# Patient Record
Sex: FEMALE | Race: WHITE | NOT HISPANIC OR LATINO | Employment: OTHER | URBAN - METROPOLITAN AREA
[De-identification: names, ages, dates, MRNs, and addresses within clinical notes are randomized per-mention and may not be internally consistent; named-entity substitution may affect disease eponyms.]

---

## 2018-01-01 ENCOUNTER — APPOINTMENT (EMERGENCY)
Dept: RADIOLOGY | Facility: HOSPITAL | Age: 83
End: 2018-01-01
Payer: COMMERCIAL

## 2018-01-01 ENCOUNTER — HOSPITAL ENCOUNTER (EMERGENCY)
Facility: HOSPITAL | Age: 83
End: 2018-02-22
Attending: EMERGENCY MEDICINE | Admitting: EMERGENCY MEDICINE
Payer: COMMERCIAL

## 2018-01-01 DIAGNOSIS — I47.2 VT (VENTRICULAR TACHYCARDIA) (HCC): ICD-10-CM

## 2018-01-01 DIAGNOSIS — I49.01 VF (VENTRICULAR FIBRILLATION) (HCC): ICD-10-CM

## 2018-01-01 DIAGNOSIS — I46.9 CARDIAC ARREST (HCC): Primary | ICD-10-CM

## 2018-01-01 LAB
ANION GAP BLD CALC-SCNC: 18 MMOL/L (ref 4–13)
BUN BLD-MCNC: 56 MG/DL (ref 5–25)
CA-I BLD-SCNC: 1.19 MMOL/L (ref 1.12–1.32)
CHLORIDE BLD-SCNC: 113 MMOL/L (ref 100–108)
CREAT BLD-MCNC: 1.1 MG/DL (ref 0.6–1.3)
GFR SERPL CREATININE-BSD FRML MDRD: 43 ML/MIN/1.73SQ M
GLUCOSE SERPL-MCNC: 276 MG/DL (ref 65–140)
HCT VFR BLD CALC: 33 % (ref 34.8–46.1)
HGB BLDA-MCNC: 11.2 G/DL (ref 11.5–15.4)
PCO2 BLD: 17 MMOL/L (ref 21–32)
POTASSIUM BLD-SCNC: 3.9 MMOL/L (ref 3.5–5.3)
SODIUM BLD-SCNC: 143 MMOL/L (ref 136–145)
SPECIMEN SOURCE: ABNORMAL

## 2018-01-01 PROCEDURE — 80047 BASIC METABLC PNL IONIZED CA: CPT

## 2018-01-01 PROCEDURE — 99285 EMERGENCY DEPT VISIT HI MDM: CPT

## 2018-01-01 PROCEDURE — 85014 HEMATOCRIT: CPT

## 2018-01-01 PROCEDURE — 96375 TX/PRO/DX INJ NEW DRUG ADDON: CPT

## 2018-01-01 PROCEDURE — 92950 HEART/LUNG RESUSCITATION CPR: CPT

## 2018-01-01 PROCEDURE — 96374 THER/PROPH/DIAG INJ IV PUSH: CPT

## 2018-01-01 RX ORDER — EPINEPHRINE 0.1 MG/ML
SYRINGE (ML) INJECTION CODE/TRAUMA/SEDATION MEDICATION
Status: COMPLETED | OUTPATIENT
Start: 2018-01-01 | End: 2018-01-01

## 2018-01-01 RX ORDER — AMIODARONE HYDROCHLORIDE 50 MG/ML
INJECTION, SOLUTION INTRAVENOUS CODE/TRAUMA/SEDATION MEDICATION
Status: COMPLETED | OUTPATIENT
Start: 2018-01-01 | End: 2018-01-01

## 2018-01-01 RX ORDER — SODIUM BICARBONATE 84 MG/ML
INJECTION, SOLUTION INTRAVENOUS CODE/TRAUMA/SEDATION MEDICATION
Status: COMPLETED | OUTPATIENT
Start: 2018-01-01 | End: 2018-01-01

## 2018-01-01 RX ORDER — LOSARTAN POTASSIUM 25 MG/1
50 TABLET ORAL DAILY
COMMUNITY

## 2018-01-01 RX ORDER — DOCUSATE SODIUM 100 MG/1
100 CAPSULE, LIQUID FILLED ORAL DAILY
COMMUNITY

## 2018-01-01 RX ORDER — ASPIRIN 81 MG/1
81 TABLET, CHEWABLE ORAL DAILY
COMMUNITY

## 2018-01-01 RX ORDER — FUROSEMIDE 40 MG/1
40 TABLET ORAL DAILY
COMMUNITY

## 2018-01-01 RX ORDER — ATORVASTATIN CALCIUM 40 MG/1
40 TABLET, FILM COATED ORAL DAILY
COMMUNITY

## 2018-01-01 RX ORDER — CLOPIDOGREL BISULFATE 75 MG/1
75 TABLET ORAL DAILY
COMMUNITY

## 2018-01-01 RX ORDER — NITROGLYCERIN 0.4 MG/1
0.4 TABLET SUBLINGUAL
COMMUNITY

## 2018-01-01 RX ADMIN — EPINEPHRINE 1 MG: 0.1 INJECTION, SOLUTION ENDOTRACHEAL; INTRACARDIAC; INTRAVENOUS at 21:17

## 2018-01-01 RX ADMIN — SODIUM BICARBONATE 50 MEQ: 84 INJECTION, SOLUTION INTRAVENOUS at 21:20

## 2018-01-01 RX ADMIN — EPINEPHRINE 1 MG: 0.1 INJECTION, SOLUTION ENDOTRACHEAL; INTRACARDIAC; INTRAVENOUS at 21:25

## 2018-01-01 RX ADMIN — AMIODARONE HYDROCHLORIDE 300 MG: 50 INJECTION, SOLUTION INTRAVENOUS at 21:18

## 2018-01-01 RX ADMIN — EPINEPHRINE 1 MG: 0.1 INJECTION, SOLUTION ENDOTRACHEAL; INTRACARDIAC; INTRAVENOUS at 21:21

## 2018-01-01 RX ADMIN — SODIUM BICARBONATE 50 MEQ: 84 INJECTION, SOLUTION INTRAVENOUS at 21:22

## 2018-02-22 NOTE — CODE DOCUMENTATION
Portable x-ray called to bedside  However, patient became pulseless  V-fib noted on monitor  Compressions via saturnino resumed at this time

## 2018-02-22 NOTE — CODE DOCUMENTATION
Compressions stopped at this time  No pulse noted  Patient in v-fib  Patient shocked at 200 J  Compressions via saturnino immediately resumed

## 2018-02-22 NOTE — CODE DOCUMENTATION
Compressions stopped at this time  Carotid and femoral pulses noted  Patient noted to be in sinus tachycardia at a rate of 113

## 2018-02-22 NOTE — CODE DOCUMENTATION
Compressions paused at this time to check pulses  No pulses noted  At this time, ACLS in progress for total of 36 minutes  Despite all efforts, patient remains in a non-life sustaining rhythm  Code team agreed all efforts exhausted at this time  Patient pronounced  by Dr Aniya Sher and Dr Eunice Hilliard at this time

## 2018-02-22 NOTE — ED PROVIDER NOTES
History  Chief Complaint   Patient presents with    Cardiac Arrest     Patient was going through mail with her son when she "slumped over " Witnessed cardiac arrest  EMS on scene at 2051  This is a 81 yo F who presented as a cardiac arrest  According to paramedics and verified by son, patient was having a conversation with son when she froze, clenched her chest and fell  Son started CPR on her  Son states she lives by herself and was doing fine today, he normally goes to visit her  Paramedics state initial rhythm was Vfib received 3 total shocks and 3 epinephrine and lidocaine  Total ACLS prior to arrival at the hospital was 35 minutes with no ROSC  On arrival patient was intubated, tube confirmed by capnography, breath sounds  Rhyhtm on arrival was Vfib  She received multiple rounds of epinephrine, amiodarone, and bicarbonate  Bedside US performed which showed Vfib rhythm with no pericardial effusion  Normal lung sliding bilaterally and abdomen negative for blood  No signs of trauma appreciated  With no ROSC, time of death called 2127  Spoke with son regarding death  Cause of death : Ventricular fibrillation, cardiac arrest              Prior to Admission Medications   Prescriptions Last Dose Informant Patient Reported?  Taking?   aspirin 81 mg chewable tablet   Yes Yes   Sig: Chew 81 mg daily   atorvastatin (LIPITOR) 40 mg tablet   Yes Yes   Sig: Take 40 mg by mouth daily   clopidogrel (PLAVIX) 75 mg tablet   Yes Yes   Sig: Take 75 mg by mouth daily   docusate sodium (COLACE) 100 mg capsule   Yes Yes   Sig: Take 100 mg by mouth daily   furosemide (LASIX) 40 mg tablet   Yes Yes   Sig: Take 40 mg by mouth daily   losartan (COZAAR) 25 mg tablet   Yes Yes   Sig: Take 50 mg by mouth daily   metFORMIN (GLUCOPHAGE) 500 mg tablet   Yes Yes   Sig: Take 500 mg by mouth 2 (two) times a day   metoprolol tartrate (LOPRESSOR) 25 mg tablet   Yes Yes   Sig: Take 12 5 mg by mouth every 12 (twelve) hours   nitroglycerin (NITROSTAT) 0 4 mg SL tablet   Yes Yes   Sig: Place 0 4 mg under the tongue every 5 (five) minutes as needed for chest pain   sitaGLIPtin (JANUVIA) 50 mg tablet   Yes Yes   Sig: Take 50 mg by mouth daily      Facility-Administered Medications: None       Past Medical History:   Diagnosis Date    Diabetes mellitus ([facility] [address] )     Hyperlipidemia     Hypertension     MI, old        History reviewed  No pertinent surgical history  History reviewed  No pertinent family history  I have reviewed and agree with the history as documented  Social History   Substance Use Topics    Smoking status: Unknown If Ever Smoked    Smokeless tobacco: Not on file    Alcohol use Not on file        Review of Systems   Unable to perform ROS: Intubated       Physical Exam  ED Triage Vitals   Temp Pulse Respirations Blood Pressure SpO2   -- 02/21/18 2123 02/21/18 2127 02/21/18 2114 --    (!) 113 (!) 0 141/80       Temp src Heart Rate Source Patient Position - Orthostatic VS BP Location FiO2 (%)   -- 02/21/18 2123 02/21/18 2114 02/21/18 2114 --    Monitor Lying Right arm       Pain Score       --                  Orthostatic Vital Signs  Vitals:    02/21/18 2114 02/21/18 2123 02/21/18 2127   BP: 141/80  (!) 0/0   Pulse:  (!) 113 (!) 0   Patient Position - Orthostatic VS: Lying         Physical Exam   Constitutional:   Pale, cold extremities  intubated   HENT:   Head: Normocephalic  Eyes:   Dilated and fixed pupils    Cardiovascular:   Ventricular fibrillation, no pulse   Pulmonary/Chest:   Equal breath sounds bilaterally, intubated with 7 0 tube prehospital    Abdominal:   Distended, soft abdmen    Neurological:   Unresponsive, GCS 3   Skin: Capillary refill takes more than 3 seconds  No rash noted     Cold extremities        ED Medications  Medications    EMS REPLENISHMENT MED ( Does not apply Given to EMS 2/21/18 2127)   EPINEPHrine (ADRENALIN) 1 mg/10 mL injection (1 mg Intravenous Given 2/21/18 2117)   amiodarone 150 mg/3 mL injection (300 mg Intravenous Given 2/21/18 2118)   sodium bicarbonate 50 mEq/50 mL injection (50 mEq Intravenous Given 2/21/18 2120)   EPINEPHrine (ADRENALIN) 1 mg/10 mL injection (1 mg Intravenous Given 2/21/18 2121)   sodium bicarbonate 50 mEq/50 mL injection (50 mEq Intravenous Given 2/21/18 2122)   EPINEPHrine (ADRENALIN) 1 mg/10 mL injection (1 mg Intravenous Given 2/21/18 2125)       Diagnostic Studies  Results Reviewed     Procedure Component Value Units Date/Time    POCT Chem 8+ [[id number]]  (Abnormal) Collected:  02/21/18 2121    Lab Status:  Final result Updated:  02/21/18 2139     SODIUM, I-STAT 143 mmol/l      Potassium, i-STAT 3 9 mmol/L      Chloride, istat 113 (H) mmol/L      CO2, i-STAT 17 (L) mmol/L      Anion Gap, Istat 18 (H) mmol/L      Calcium, Ionized i-STAT 1 19 mmol/L      BUN, I-STAT 56 (H) mg/dl      Creatinine, i-STAT 1 1 mg/dl      eGFR 43 ml/min/1 73sq m      Glucose, i-STAT 276 (H) mg/dl      Hct, i-STAT 33 (L) %      Hgb, i-STAT 11 2 (L) g/dl      Specimen Type VENOUS    Comprehensive metabolic panel [[id number]]     Lab Status:  No result Specimen:  Blood     CBC and differential [[id number]]     Lab Status:  No result Specimen:  Blood     B-type natriuretic peptide [[id number]]     Lab Status:  No result Specimen:  Blood     Protime-INR [[id number]]     Lab Status:  No result Specimen:  Blood     APTT [[id number]]     Lab Status:  No result Specimen:  Blood     Troponin I [[id number]]     Lab Status:  No result Specimen:  Blood                  No orders to display         Procedures  Procedures      Phone Consults  ED Phone Contact    ED Course  ED Course as of Feb 21 2328 Wed Feb 21, 2018 2137 Time of death 2127                                [facility]  CritCare Time    Disposition  Final diagnoses:   Cardiac arrest ([facility] [address] )   VF (ventricular fibrillation) ([facility] [address] )   VT (ventricular tachycardia) ([facility] [address] )     Time reflects when diagnosis was documented in both MDM as applicable and the Disposition within this note     Time User Action Codes Description Comment    2018  9:47 PM Altamease Zackery Add [I46 9] Cardiac arrest ([facility] [address] )     2018  9:48 PM Gearldine Lard Add [I49 01] VF (ventricular fibrillation) ([facility] [address] )     2018  9:48 PM Gearldine Lard Add [I47 2] VT (ventricular tachycardia) [facility])       ED Disposition     ED Disposition Condition Comment            Follow-up Information    None       Patient's Medications   Discharge Prescriptions    No medications on file     No discharge procedures on file  ED Provider  Attending physically available and evaluated Chapis Llanos I managed the patient along with the ED Attending      Electronically Signed by         Anna Marie Contreras MD  18 [phone]

## 2018-02-22 NOTE — ED ATTENDING ATTESTATION
Laurie Merchant MD, saw and evaluated the patient  All available labs and X-rays were ordered by me or the resident and have been reviewed by myself  I discussed the patient with the resident / non-physician and agree with the resident's / non-physician practitioner's findings and plan as documented in the resident's / non-physician practicitioner's note, except where noted  At this point, I agree with the current assessment done in the ED  Chief Complaint   Patient presents with    Cardiac Arrest     Patient was going through mail with her son when she "slumped over " Witnessed cardiac arrest  EMS on scene at 2051  This is a 80year old female presenting for cardiac arrest     Majority of hx is from EMS as patient is unresponsive  Patient was at dinner about 30 minutes PTA (~8:45pm) when she was talking to her son and all of a sudden became unresponsive to the family member while clenching her chest  She then slumped over in a chair  There was intermittent by-stander CPR while patient was sitting in a chair until EMS arrived  They found the patient in VF initially then that alternated with VT  Patient had a glucose checked of 130  Patient was intubated without medications with a 7-0 tube 23 at lip  Patient was given 4 epinephrines, 100mg lidocaine, 50mg lidocaine  She had defibrillation 3 times with rhythm changes between VF and VT  Upon arrival, the patient was GCS3T, unresponsive, fixed/dilated eyes  Patient was transferred over from EMS stretcher to the bed  Bilateral breath sounds were noted  Tube with condensation and eventually small amount of red fluid  Her initial rhythm was VF on the monitor and amiodarone 300mg was given in addition to 1 amp bicarbonate, EPI  She was defibrillated without change in rhythm  Mechanical CPR was continued       Bedside U/S was done:   - RUQ without fluid  - Aorta without obvious aneurysm or dissection  - No pericardial effusion  - VF of heart noted during U/S while no CPR in progress  - Anterior chest: normal lung sliding, "[place] appearance" of lungs bilaterally, +lung rockets / B-lines noted  I-stat chemistry was sent off and returned with normal K+, normal glucose  Patient was defibrillated several times  At one point, she had ROSC briefly for ~30-60 seconds with sinus tachycardia on the monitor  The patient then degraded into coarse VF again  Patient had been down from 8:45pm --> 9:27pm      Given that she had no neurologic exam, continued resistant VF/VT despite interventions with medications, absence of pulses, continuing beyond this would not be advisable  The son was updated that the time of death was :27  The patient has  at 2127: pm  No spontaneous movements were present  There was no response to verbal or tactile stimuli  No palpable pulses present in carotid artery bilaterally  No breath sounds were appreciated over bilateral lung fields  No heart sounds were auscultated across the entire precordium  Patient was pronounced dead/   Family was notified via 9:30pm  Cause of death: resistant VF/VT, suspect MI given hx    After patient , hx from son:  PMH:  - Hx of MI on medications for this  - DM  - HTN  - HLD    There were no vitals filed for this visit  - 13 point ROS couldn't be obtained  - Nursing note reviewed  Vitals reviewed  - Orders placed by myself and/or advanced practitioner / resident     - Previous chart was not reviewed  - No language barrier    - History obtained from EMS  - There are limitations to the history obtained  Reasons ROS could not be obtained: unresponsive  - Critical care time: 32 minutes    Final Diagnosis:  1  Cardiac arrest ([facility] [address] )    2  VF (ventricular fibrillation) ([facility] [address] )    3   VT (ventricular tachycardia) [facility])      ED Course      Medications    EMS REPLENISHMENT MED ( Does not apply Given to EMS 18)     No orders to display     Orders Placed This Encounter   Procedures    Comprehensive metabolic panel    CBC and differential    B-type natriuretic peptide    Protime-INR    APTT    Troponin I    Insert peripheral IV    Continuous cardiac monitoring    Continuous pulse oximetry    ECG 12 lead     Labs Reviewed   POCT CHEM 8+ - Abnormal        Result Value Ref Range Status    SODIUM, I-STAT 143  136 - 145 mmol/l Final    Potassium, i-STAT 3 9  3 5 - 5 3 mmol/L Final    Chloride, istat 113 (*) 100 - 108 mmol/L Final    CO2, i-STAT 17 (*) 21 - 32 mmol/L Final    Anion Gap, Istat 18 (*) 4 - 13 mmol/L Final    Calcium, Ionized i-STAT 1 19  1 12 - 1 32 mmol/L Final    BUN, I-STAT 56 (*) 5 - 25 mg/dl Final    Creatinine, i-STAT 1 1  0 6 - 1 3 mg/dl Final    eGFR 43  ml/min/1 73sq m Final    Glucose, i-STAT 276 (*) 65 - 140 mg/dl Final    Hct, i-STAT 33 (*) 34 8 - 46 1 % Final    Hgb, i-STAT 11 2 (*) 11 5 - 15 4 g/dl Final    Specimen Type VENOUS   Final   COMPREHENSIVE METABOLIC PANEL   CBC AND DIFFERENTIAL   NT-BNP PRO (BRAIN NATRIURETIC PEPTIDE)   PROTIME-INR   APTT   TROPONIN I     Time reflects when diagnosis was documented in both MDM as applicable and the Disposition within this note     Time User Action Codes Description Comment    2018  9:47 PM Patricia Cyr Add [I46 9] Cardiac arrest ([facility] [address] )     2018  9:48 PM Edwin Carroll Add [I49 01] VF (ventricular fibrillation) ([facility] [address] )     2018  9:48 PM Edwin Carroll Add [I47 2] VT (ventricular tachycardia) [facility])       ED Disposition     ED Disposition Condition Comment            Follow-up Information    None       Patient's Medications    No medications on file     No discharge procedures on file  None       Portions of the record may have been created with voice recognition software  Occasional wrong word or "sound a like" substitutions may have occurred due to the inherent limitations of voice recognition software   Read the chart carefully and recognize, using context, where substitutions have occurred      Electronically signed by:  Mart Willson